# Patient Record
Sex: MALE | Race: WHITE | Employment: FULL TIME | URBAN - METROPOLITAN AREA
[De-identification: names, ages, dates, MRNs, and addresses within clinical notes are randomized per-mention and may not be internally consistent; named-entity substitution may affect disease eponyms.]

---

## 2021-12-16 ENCOUNTER — OFFICE VISIT (OUTPATIENT)
Dept: INTERNAL MEDICINE CLINIC | Facility: CLINIC | Age: 31
End: 2021-12-16
Payer: COMMERCIAL

## 2021-12-16 VITALS
BODY MASS INDEX: 25.99 KG/M2 | OXYGEN SATURATION: 98 % | DIASTOLIC BLOOD PRESSURE: 80 MMHG | HEIGHT: 71 IN | SYSTOLIC BLOOD PRESSURE: 128 MMHG | WEIGHT: 185.63 LBS | HEART RATE: 76 BPM

## 2021-12-16 DIAGNOSIS — Z00.00 WELLNESS EXAMINATION: Primary | ICD-10-CM

## 2021-12-16 DIAGNOSIS — L60.0 INGROWN NAIL OF GREAT TOE OF LEFT FOOT: ICD-10-CM

## 2021-12-16 PROCEDURE — 99385 PREV VISIT NEW AGE 18-39: CPT | Performed by: INTERNAL MEDICINE

## 2021-12-16 PROCEDURE — 3074F SYST BP LT 130 MM HG: CPT | Performed by: INTERNAL MEDICINE

## 2021-12-16 PROCEDURE — 3008F BODY MASS INDEX DOCD: CPT | Performed by: INTERNAL MEDICINE

## 2021-12-16 PROCEDURE — 3079F DIAST BP 80-89 MM HG: CPT | Performed by: INTERNAL MEDICINE

## 2021-12-16 RX ORDER — NAPROXEN 500 MG/1
TABLET ORAL
COMMUNITY
Start: 2021-10-12

## 2021-12-16 RX ORDER — FAMOTIDINE 10 MG/1
TABLET ORAL
COMMUNITY
Start: 2021-10-11

## 2021-12-16 RX ORDER — SULFAMETHOXAZOLE AND TRIMETHOPRIM 800; 160 MG/1; MG/1
TABLET ORAL
COMMUNITY
Start: 2021-11-15

## 2021-12-16 RX ORDER — IBUPROFEN 600 MG/1
TABLET ORAL
COMMUNITY
Start: 2021-11-15

## 2021-12-16 RX ORDER — TERBINAFINE HYDROCHLORIDE 250 MG/1
TABLET ORAL
COMMUNITY
Start: 2021-12-09

## 2021-12-16 NOTE — PROGRESS NOTES
Subjective:   Patient ID: Sheron Freitas is a 27year old male. HPIPatient here to get established as a new patient. Has problems with a chronic ingrown toe nail on the left toe.     Med hx - Chronic headaches  Surgical hx -  Smoker 1 pack perday  Etoh ra Visit:  Requested Prescriptions      No prescriptions requested or ordered in this encounter       Imaging & Referrals:  None

## 2022-01-17 ENCOUNTER — OFFICE VISIT (OUTPATIENT)
Dept: PODIATRY CLINIC | Facility: CLINIC | Age: 32
End: 2022-01-17
Payer: COMMERCIAL

## 2022-01-17 VITALS — SYSTOLIC BLOOD PRESSURE: 133 MMHG | DIASTOLIC BLOOD PRESSURE: 82 MMHG | HEART RATE: 77 BPM

## 2022-01-17 DIAGNOSIS — L60.0 INGROWN LEFT BIG TOENAIL: Primary | ICD-10-CM

## 2022-01-17 DIAGNOSIS — M79.675 GREAT TOE PAIN, LEFT: ICD-10-CM

## 2022-01-17 PROCEDURE — 3075F SYST BP GE 130 - 139MM HG: CPT | Performed by: PODIATRIST

## 2022-01-17 PROCEDURE — 99203 OFFICE O/P NEW LOW 30 MIN: CPT | Performed by: PODIATRIST

## 2022-01-17 PROCEDURE — 3079F DIAST BP 80-89 MM HG: CPT | Performed by: PODIATRIST

## 2022-01-17 PROCEDURE — 11750 EXCISION NAIL&NAIL MATRIX: CPT | Performed by: PODIATRIST

## 2022-01-17 NOTE — PROGRESS NOTES
Per Dr. Sherita Horner, draw up 1.5 ml 1% lidocaine and 1.5 ml of 0.5 % marcaine for injection to left big toe. Cara Lomeli    Pt given soaking instruction sheet. No VS taken post procedure.

## 2022-01-17 NOTE — PROGRESS NOTES
[facility]iat[facility]  Progress Note    Luis Quach is a 27year old male. Patient presents with:  Ingrown Toenail: Left foot great toe noticed about 2 months ago. 2/10 pain at this time. Patient noticed redness no swelling, and no drainage.         HPI proximally to warm distally bilateral.  Neurological examination:   Vibratory (128-Hz tuning fork) sensation is present to right and is present to left. Sharp/dull is present to right and is present to left.   Musculoskeletal examination:  Muscle Strength complications encountered. Written instructions have been given and reviewed with pt regarding after care. Follow up in 2 weeks. No follow-ups on file.     Delia Pichardo DPM  1/17/2022

## 2022-01-31 ENCOUNTER — OFFICE VISIT (OUTPATIENT)
Dept: PODIATRY CLINIC | Facility: CLINIC | Age: 32
End: 2022-01-31
Payer: COMMERCIAL

## 2022-01-31 DIAGNOSIS — Z98.890 S/P NAIL SURGERY: Primary | ICD-10-CM

## 2022-01-31 PROCEDURE — 99212 OFFICE O/P EST SF 10 MIN: CPT | Performed by: PODIATRIST

## 2022-01-31 NOTE — PROGRESS NOTES
[facility] Podiatry  Progress Note    Deborah Samuel is a 27year old male. Patient presents with:   Follow - Up: Left great toenail ingrown toenail removal, denies pain        HPI:     This is a pleasant male that presents to clinic today due to left ha is present to left. Musculoskeletal examination:  Muscle Strength is 5/5.         LABS & IMAGING:   No results found for: GLU, BUN, CREATSERUM, BUNCREA, ANIONGAP, GFRAA, GFRNAA, CA, NA, K, CL, CO2, OSMOCALC     No results found for: EAG, A1C     No results

## 2022-04-29 ENCOUNTER — OFFICE VISIT (OUTPATIENT)
Dept: PODIATRY CLINIC | Facility: CLINIC | Age: 32
End: 2022-04-29
Payer: COMMERCIAL

## 2022-04-29 DIAGNOSIS — L60.8 DISCOLORATION AND THICKENING OF NAILS BOTH FEET: Primary | ICD-10-CM

## 2022-04-29 PROCEDURE — 11755 BIOPSY NAIL UNIT: CPT | Performed by: PODIATRIST

## 2022-04-29 PROCEDURE — 99213 OFFICE O/P EST LOW 20 MIN: CPT | Performed by: PODIATRIST

## 2022-04-29 NOTE — PROGRESS NOTES
[facility], [facility] Podiatry  Progress Note    Amee Pickering is a 27year old male. Patient presents with:  Ingrown Toenail: R foot, great toe. States on and off pain. Denies any swelling, numbness or tingling. ---L foot, had ingrown toenail procedure back in January. Would like to check nail due to change. Currently denies any pain. HPI:     This is a pleasant male that presents to clinic today S/P left hallux lateral nail border matrixectomy f/u. He states it is not painful but has noticed some white discoloration. He also has noticed some discoloration and thickening to the right hallux toenail as well. He did finish 90 day course of oral lamisil in Feb but this did not really help. Allergies: Patient has no known allergies. No current outpatient medications on file. History reviewed. No pertinent past medical history. History reviewed. No pertinent surgical history. History reviewed. No pertinent family history. Social History    Socioeconomic History      Marital status:     Tobacco Use      Smoking status: Current Every Day Smoker      Smokeless tobacco: Never Used    Substance and Sexual Activity      Alcohol use: Yes        Comment: occ      Drug use: Never          REVIEW OF SYSTEMS:   Denies nausea, fever, chills  No calf pain  No other muscle or joint aches  Denies chest pain or shortness of breath. EXAM:   There were no vitals taken for this visit. Constitutional:   Patient in no apparent distress. Well kept Of normal body habitus. Alert and oriented to person, place, and time. Integumentary examination:   There are no varicosities. Skin appears moist, warm, and supple with positive hair growth.      B/L hallux toenails are slightly thickened and discolored  Vascular examination:   DP pulse is 2/4  PT pulse is 2/4  Capillary refill is immediate  Edema is not present bilateral.  Temperature warm proximally to warm distally bilateral.  Neurological examination:   Vibratory (128-Hz tuning fork) sensation is present to right and is present to left. Sharp/dull is present to right and is present to left. Musculoskeletal examination:  Muscle Strength is 5/5. LABS & IMAGING:   No results found for: GLU, BUN, CREATSERUM, BUNCREA, ANIONGAP, GFRAA, GFRNAA, CA, NA, K, CL, CO2, OSMOCALC     No results found for: EAG, A1C     No results found. ASSESSMENT AND PLAN:   Diagnoses and all orders for this visit:    Discoloration and thickening of nails both feet        Plan:     Reviewed treatment options for nail dystrophy / onychomycosis including:   No treatment and monitoring, topical meds, oral meds, nail removal and laser treatment. Advantages and disadvantages of each reviewed. Using oral agents would require regular   blood test monitoring due to risk of hepatotoxicity and other adverse reactions including drug interactions. Topical meds are much safer yet carry very low efficacy. Recommended nail biopsy since patient did complete oral lamisil 2 months ago with no significant improvement. Pt agrees. Procedure: Nail Biopsy [id number]  Using nail forceps a portion of the b/l hallux toenail was excised and sent to pathology for PAS stain. Pt tolerated the procedure well without complication. RTC 2 weeks, will review nail biopsy results. No follow-ups on file.     Alois Leyden, DPM  4/29/22

## 2022-05-02 ENCOUNTER — TELEPHONE (OUTPATIENT)
Dept: PODIATRY CLINIC | Facility: CLINIC | Age: 32
End: 2022-05-02

## 2022-05-02 NOTE — TELEPHONE ENCOUNTER
Please call [facility] concerning nail biopsy results. The path is showing that the status is final yet there is no information regarding the nail biopsy.

## 2022-05-02 NOTE — TELEPHONE ENCOUNTER
Called Liliane and RM on confidential VM that specimens are form right and left hallux.  CB if any further q's

## 2022-05-02 NOTE — TELEPHONE ENCOUNTER
[facility] calling to speak with nurse to ask which sight, which toe, which foot? Please call at [phone],OYSPSG.

## 2022-05-03 NOTE — TELEPHONE ENCOUNTER
S/w [facility]- she states the results are still in preliminary status and have not been finalized yet. The \"test in question\" has already been resolved. Once results are finalized they will be sent over.

## 2022-05-16 ENCOUNTER — OFFICE VISIT (OUTPATIENT)
Dept: PODIATRY CLINIC | Facility: CLINIC | Age: 32
End: 2022-05-16
Payer: COMMERCIAL

## 2022-05-16 DIAGNOSIS — M79.674 GREAT TOE PAIN, RIGHT: ICD-10-CM

## 2022-05-16 DIAGNOSIS — L60.0 INGROWN RIGHT BIG TOENAIL: Primary | ICD-10-CM

## 2022-05-16 DIAGNOSIS — L60.8 DISCOLORATION AND THICKENING OF NAILS BOTH FEET: ICD-10-CM

## 2022-05-16 PROCEDURE — 99213 OFFICE O/P EST LOW 20 MIN: CPT | Performed by: PODIATRIST

## 2022-05-16 NOTE — PROGRESS NOTES
[facility], [facility] Podiatry  Progress Note    Kali Rai is a 27year old male. Patient presents with:  Ingrown Toenail: Right  big toe f/u - had procedure on 4/29/22 - still has drainage from the R big toe - has some pain rated as 5/10 with pressure on it - at rest pain is like a 2/10 on and off   Toenail Fungus: Bilateral big toes f/u and lab results -         HPI:     This is a pleasant male that presents to clinic today for nail biopsy results. He also complains of ingrown toenail right great toe. He denies any drainage but would like to discuss treatment options. Allergies: Patient has no known allergies. No current outpatient medications on file. History reviewed. No pertinent past medical history. History reviewed. No pertinent surgical history. History reviewed. No pertinent family history. Social History    Socioeconomic History      Marital status:     Tobacco Use      Smoking status: Current Every Day Smoker      Smokeless tobacco: Never Used    Substance and Sexual Activity      Alcohol use: Yes        Comment: occ      Drug use: Never          REVIEW OF SYSTEMS:   Denies nausea, fever, chills  No calf pain  No other muscle or joint aches  Denies chest pain or shortness of breath. EXAM:   There were no vitals taken for this visit. Constitutional:   Patient in no apparent distress. Well kept Of normal body habitus. Alert and oriented to person, place, and time. Integumentary examination:   There are no varicosities. Skin appears moist, warm, and supple with positive hair growth.      Right hallux medial nail border incurvated with no SOI    B/L hallux toenails are slightly thickened and discolored  Vascular examination:   DP pulse is 2/4  PT pulse is 2/4  Capillary refill is immediate  Edema is not present bilateral.  Temperature warm proximally to warm distally bilateral.  Neurological examination:   Vibratory (128-Hz tuning fork) sensation is present to right and is present to left. Sharp/dull is present to right and is present to left. Musculoskeletal examination:  Muscle Strength is 5/5. POP to right hallux medial nail border    LABS & IMAGING:   No results found for: GLU, BUN, CREATSERUM, BUNCREA, ANIONGAP, GFRAA, GFRNAA, CA, NA, K, CL, CO2, OSMOCALC     No results found for: EAG, A1C     No results found. ASSESSMENT AND PLAN:   Diagnoses and all orders for this visit:    Ingrown right big toenail    Great toe pain, right    Discoloration and thickening of nails both feet        Plan:     B/L hallux nail biopsy was negative for fungal elements      Treatment options reviewed with the patient related to condition/diagnosis. Discussed advantages and disadvantages as well as risks/potential complications related to nail surgery. Risk of recurrence explained. Pt would like to hold off on nail surgery at this time. RTC PRN right hallux medial nail border matrixectomy    No follow-ups on file.     Pete Nelson DPM  5/16/22

## 2022-05-23 ENCOUNTER — OFFICE VISIT (OUTPATIENT)
Dept: INTERNAL MEDICINE CLINIC | Facility: CLINIC | Age: 32
End: 2022-05-23
Payer: COMMERCIAL

## 2022-05-23 VITALS
SYSTOLIC BLOOD PRESSURE: 120 MMHG | WEIGHT: 180 LBS | DIASTOLIC BLOOD PRESSURE: 70 MMHG | OXYGEN SATURATION: 97 % | BODY MASS INDEX: 25.2 KG/M2 | HEIGHT: 71 IN | HEART RATE: 81 BPM

## 2022-05-23 DIAGNOSIS — S39.012A STRAIN OF LUMBAR REGION, INITIAL ENCOUNTER: Primary | ICD-10-CM

## 2022-05-23 PROCEDURE — 3078F DIAST BP <80 MM HG: CPT | Performed by: INTERNAL MEDICINE

## 2022-05-23 PROCEDURE — 99213 OFFICE O/P EST LOW 20 MIN: CPT | Performed by: INTERNAL MEDICINE

## 2022-05-23 PROCEDURE — 3008F BODY MASS INDEX DOCD: CPT | Performed by: INTERNAL MEDICINE

## 2022-05-23 PROCEDURE — 3074F SYST BP LT 130 MM HG: CPT | Performed by: INTERNAL MEDICINE

## 2022-05-23 NOTE — PROGRESS NOTES
Subjective:   Patient ID: Gustavo Luke is a 27year old male. Back Pain  Pertinent negatives include no numbness or weakness. Pt here for evaluation of right flank pains which resolved on its own. Has been a  for 10 years. Has done some on and off . Has used Ibuprofen with some improvement. History/Other:   Review of Systems   Musculoskeletal: Positive for back pain. Neurological: Negative for weakness and numbness. No current outpatient medications on file. Allergies:No Known Allergies    Objective:   Physical Exam  Constitutional:       Appearance: He is normal weight. He is not ill-appearing. Eyes:      General: No scleral icterus. Pupils: Pupils are equal, round, and reactive to light. Cardiovascular:      Rate and Rhythm: Normal rate and regular rhythm. Pulmonary:      Effort: Pulmonary effort is normal.      Breath sounds: Normal breath sounds. Abdominal:      Palpations: Abdomen is soft. Musculoskeletal:         General: Tenderness (left paravertebral muscle pain ) present. Cervical back: Neck supple. Right lower leg: No edema. Left lower leg: No edema. Skin:     Findings: No lesion. Neurological:      Mental Status: Mental status is at baseline. Psychiatric:         Thought Content: Thought content normal.         Assessment & Plan:   Strain of lumbar region, initial encounter  (primary encounter diagnosis)   - will get xray if symptoms return  No orders of the defined types were placed in this encounter.       Meds This Visit:  Requested Prescriptions      No prescriptions requested or ordered in this encounter       Imaging & Referrals:  XR LUMBAR SPINE (MIN 4 VIEWS) (CPT=72110)

## 2023-07-20 ENCOUNTER — OFFICE VISIT (OUTPATIENT)
Dept: INTERNAL MEDICINE CLINIC | Facility: CLINIC | Age: 33
End: 2023-07-20
Payer: COMMERCIAL

## 2023-07-20 VITALS
HEART RATE: 70 BPM | BODY MASS INDEX: 26.32 KG/M2 | DIASTOLIC BLOOD PRESSURE: 70 MMHG | WEIGHT: 188 LBS | SYSTOLIC BLOOD PRESSURE: 120 MMHG | HEIGHT: 71 IN | OXYGEN SATURATION: 98 % | TEMPERATURE: 98 F

## 2023-07-20 DIAGNOSIS — Z00.00 WELLNESS EXAMINATION: ICD-10-CM

## 2023-07-20 DIAGNOSIS — F17.210 CIGARETTE SMOKER: ICD-10-CM

## 2023-07-20 DIAGNOSIS — L05.91 PILONIDAL CYST WITHOUT INFECTION: Primary | ICD-10-CM

## 2023-07-20 PROCEDURE — 99395 PREV VISIT EST AGE 18-39: CPT | Performed by: INTERNAL MEDICINE

## 2023-07-20 PROCEDURE — 3008F BODY MASS INDEX DOCD: CPT | Performed by: INTERNAL MEDICINE

## 2023-07-20 PROCEDURE — 3074F SYST BP LT 130 MM HG: CPT | Performed by: INTERNAL MEDICINE

## 2023-07-20 PROCEDURE — 3078F DIAST BP <80 MM HG: CPT | Performed by: INTERNAL MEDICINE

## 2023-07-20 RX ORDER — VARENICLINE TARTRATE 1 MG/1
1 TABLET, FILM COATED ORAL 2 TIMES DAILY
Qty: 60 TABLET | Refills: 6 | Status: SHIPPED | OUTPATIENT
Start: 2023-07-20 | End: 2024-02-15

## 2023-07-20 RX ORDER — CEPHALEXIN 500 MG/1
500 CAPSULE ORAL 3 TIMES DAILY
Qty: 30 CAPSULE | Refills: 3 | Status: SHIPPED | OUTPATIENT
Start: 2023-07-20

## 2023-08-09 LAB
ABSOLUTE BASOPHILS: 63 CELLS/UL (ref 0–200)
ABSOLUTE EOSINOPHILS: 252 CELLS/UL (ref 15–500)
ABSOLUTE LYMPHOCYTES: 2310 CELLS/UL (ref 850–3900)
ABSOLUTE MONOCYTES: 588 CELLS/UL (ref 200–950)
ABSOLUTE NEUTROPHILS: 3787 CELLS/UL (ref 1500–7800)
ALBUMIN/GLOBULIN RATIO: 1.8 (CALC) (ref 1–2.5)
ALBUMIN: 4.6 G/DL (ref 3.6–5.1)
ALKALINE PHOSPHATASE: 57 U/L (ref 36–130)
ALT: 20 U/L (ref 9–46)
AST: 21 U/L (ref 10–40)
BASOPHILS: 0.9 %
BILIRUBIN, TOTAL: 0.7 MG/DL (ref 0.2–1.2)
BUN: 17 MG/DL (ref 7–25)
CALCIUM: 9.7 MG/DL (ref 8.6–10.3)
CARBON DIOXIDE: 28 MMOL/L (ref 20–32)
CHLORIDE: 105 MMOL/L (ref 98–110)
CHOL/HDLC RATIO: 5.2 (CALC)
CHOLESTEROL, TOTAL: 196 MG/DL
CREATININE: 1.04 MG/DL (ref 0.6–1.26)
EGFR: 97 ML/MIN/1.73M2
EOSINOPHILS: 3.6 %
GLOBULIN: 2.6 G/DL (CALC) (ref 1.9–3.7)
GLUCOSE: 94 MG/DL (ref 65–99)
HDL CHOLESTEROL: 38 MG/DL
HEMATOCRIT: 49.3 % (ref 38.5–50)
HEMOGLOBIN: 16.6 G/DL (ref 13.2–17.1)
LDL-CHOLESTEROL: 129 MG/DL (CALC)
LYMPHOCYTES: 33 %
MCH: 31.9 PG (ref 27–33)
MCHC: 33.7 G/DL (ref 32–36)
MCV: 94.6 FL (ref 80–100)
MONOCYTES: 8.4 %
MPV: 11 FL (ref 7.5–12.5)
NEUTROPHILS: 54.1 %
NON-HDL CHOLESTEROL: 158 MG/DL (CALC)
PLATELET COUNT: 219 THOUSAND/UL (ref 140–400)
POTASSIUM: 4.6 MMOL/L (ref 3.5–5.3)
PROTEIN, TOTAL: 7.2 G/DL (ref 6.1–8.1)
RDW: 12.6 % (ref 11–15)
RED BLOOD CELL COUNT: 5.21 MILLION/UL (ref 4.2–5.8)
SODIUM: 143 MMOL/L (ref 135–146)
TRIGLYCERIDES: 171 MG/DL
WHITE BLOOD CELL COUNT: 7 THOUSAND/UL (ref 3.8–10.8)

## 2023-11-16 ENCOUNTER — OFFICE VISIT (OUTPATIENT)
Dept: INTERNAL MEDICINE CLINIC | Facility: CLINIC | Age: 33
End: 2023-11-16
Payer: COMMERCIAL

## 2023-11-16 VITALS
DIASTOLIC BLOOD PRESSURE: 70 MMHG | WEIGHT: 190 LBS | HEIGHT: 71 IN | HEART RATE: 72 BPM | BODY MASS INDEX: 26.6 KG/M2 | OXYGEN SATURATION: 99 % | SYSTOLIC BLOOD PRESSURE: 120 MMHG

## 2023-11-16 DIAGNOSIS — R39.198 URINARY STREAM SLOWING: ICD-10-CM

## 2023-11-16 DIAGNOSIS — J35.01 CHRONIC TONSILLITIS: Primary | ICD-10-CM

## 2023-11-16 PROCEDURE — 3078F DIAST BP <80 MM HG: CPT | Performed by: INTERNAL MEDICINE

## 2023-11-16 PROCEDURE — 3008F BODY MASS INDEX DOCD: CPT | Performed by: INTERNAL MEDICINE

## 2023-11-16 PROCEDURE — 3074F SYST BP LT 130 MM HG: CPT | Performed by: INTERNAL MEDICINE

## 2023-11-16 PROCEDURE — 99214 OFFICE O/P EST MOD 30 MIN: CPT | Performed by: INTERNAL MEDICINE

## 2023-11-16 RX ORDER — AMOXICILLIN 500 MG/1
500 CAPSULE ORAL 3 TIMES DAILY
Qty: 30 CAPSULE | Refills: 0 | Status: SHIPPED | OUTPATIENT
Start: 2023-11-16 | End: 2023-11-26

## 2023-11-21 ENCOUNTER — TELEPHONE (OUTPATIENT)
Dept: INTERNAL MEDICINE CLINIC | Facility: CLINIC | Age: 33
End: 2023-11-21

## 2023-11-21 DIAGNOSIS — R39.198 SLOWING, URINARY STREAM: Primary | ICD-10-CM

## 2023-11-21 NOTE — TELEPHONE ENCOUNTER
E-message from [facility] received re: [facility] [address] [facility] and new [place] referral needs to be written. \"Please note that the requested provider the patient is being referred to Damaso Pickard MD) is NOT an In-Network Provider. Please re-direct the patient to an In-Network Provider. Please update the referral with In-Network provider and send a message back when completed so we can obtain authorization. Until we hear back, this referral will remain closed. If this helps, Dr Kervin Gamboa in [place] office, Dr Tom Esteban in [place] offices, or Dr Cristobal Redd in [place] office are within the patient's insurance plan. The patient can also contact [id number] at [phone] for a provider within their plan. Thank you,  Scot Pacheco  Patient Referral Specialist\"  ___________    New referral to Dr Jakub Rubi generated for patient. [employer] message sent to patient re: new referral and reason why.  ___________    Pt now messaging/asking for polish speaking urologist -- none of whom I am aware of that is on the \"in network\" [facility] specialist list. Advised he call [employer] and ask if they can identify a polish speaking in- for him and he can then share w/ Dr Nick Espinosa and we can re-do the referral at that time.   Also explained the availability of  services for [facility] specialist that could be utilized at Dr Sandra Velasco office as well, in lieu of a native polish speaking

## 2024-01-08 ENCOUNTER — OFFICE VISIT (OUTPATIENT)
Dept: SURGERY | Facility: CLINIC | Age: 34
End: 2024-01-08
Payer: COMMERCIAL

## 2024-01-08 VITALS — DIASTOLIC BLOOD PRESSURE: 75 MMHG | HEART RATE: 72 BPM | SYSTOLIC BLOOD PRESSURE: 117 MMHG

## 2024-01-08 DIAGNOSIS — Z87.442 HISTORY OF NEPHROLITHIASIS: ICD-10-CM

## 2024-01-08 DIAGNOSIS — R39.12 WEAK URINARY STREAM: Primary | ICD-10-CM

## 2024-01-08 DIAGNOSIS — R82.90 ABNORMAL URINALYSIS: ICD-10-CM

## 2024-01-08 LAB
BILIRUBIN: NEGATIVE
GLUCOSE (URINE DIPSTICK): NEGATIVE MG/DL
KETONES (URINE DIPSTICK): NEGATIVE MG/DL
LEUKOCYTES: NEGATIVE
MULTISTIX LOT#: ABNORMAL NUMERIC
NITRITE, URINE: NEGATIVE
PH, URINE: 5.5 (ref 4.5–8)
PROTEIN (URINE DIPSTICK): NEGATIVE MG/DL
SPECIFIC GRAVITY: 1.03 (ref 1–1.03)
UROBILINOGEN,SEMI-QN: 0.2 MG/DL (ref 0–1.9)

## 2024-01-08 NOTE — PROGRESS NOTES
[facility]rology  Initial Office Consultation    HPI:   Magdaleno Morillo is a 33 year old male here today for consultation at the request of, and a copy of this note will be sent to, Liliane Freeman MD.    1. Weak Stream  Patient presents for further evaluation and management of weak urinary stream.    Reports problem onset about a few years ago.  He has history of nephrolithiasis and reports about 2 episodes in the past where he passed kidney stones.  Last kidney stone episode was about 3 to 4 years ago.  He never required intervention.    He denies any significant associated lower urinary tract symptoms apart from the weak stream.  He reports he sometimes has to sit down to urinate.  His IPSS is 6 (0/0/0/0/5/0/1) with a quality-of-life score of 3.      He denies straining to urinate, intermittency, hesitancy, or postvoid dribbling.  No sensation of incomplete bladder emptying.  No frequency or urgency.  Nocturia x 0-1.    No history of STIs or UTIs.  Sexually active with 1 female partner.  No urethral discharge.  No hematospermia.    No dysuria or gross hematuria.    - 1/2024 consult: PVR 27 mL.  Dipstick UA with trace lysed blood.  No signs of UTI.  Send urine for microscopy.  Offered patient cystoscopy and uroflow for further evaluation he will consider.      PAST MEDICAL HISTORY: Nephrolithiasis.    PAST SURGICAL HISTORY: None.    SOCIAL HISTORY: . No children. Active smoker of 1 PPD for 12 years. Rare social alcohol. Works as a .     History reviewed. No pertinent family history.    Allergies: Patient has no known allergies.      REVIEW OF SYSTEMS:  Pertinent positives and negatives per HPI. A 12-point ROS was performed and is otherwise negative.       EXAM:  /75 (BP Location: Right arm, Patient Position: Sitting, Cuff Size: adult)   Pulse 72     Physical Exam  Constitutional:       General: He is not in acute distress.     Appearance: He is well-developed.   HENT:       Head: Normocephalic and atraumatic.   Eyes:      General: No scleral icterus.  Cardiovascular:      Rate and Rhythm: Normal rate.   Pulmonary:      Effort: Pulmonary effort is normal.   Abdominal:      General: There is no distension.      Palpations: Abdomen is soft.      Tenderness: There is no abdominal tenderness.   Genitourinary:     Penis: Uncircumcised.       Testes: Normal.         Right: Mass, tenderness or swelling not present.         Left: Mass, tenderness or swelling not present.      Epididymis:      Right: Normal.      Left: Normal.      Prostate: Normal. Not enlarged, not tender and no nodules present.      Rectum: Normal.   Musculoskeletal:         General: Normal range of motion.      Cervical back: Neck supple.   Skin:     General: Skin is warm and dry.   Neurological:      Mental Status: He is alert and oriented to person, place, and time.   Psychiatric:         Mood and Affect: Mood normal.         Behavior: Behavior normal.       LABS:  No results found.      IMAGING:  No results found.      IMPRESSION:  33 year old male with weak stream.  History of nephrolithiasis, not requiring intervention.    Findings reviewed with patient at length.  He has an isolated complaint of weak stream.  Prostate is not enlarged on JAZMIN.    I discussed with patient and offered him flexible cystourethroscopy as well as uroflowmetry for further evaluation of his symptoms.  I would be specifically evaluating for urethral stricture disease or any urethral stones.  The prostate would also be evaluated as well.    Discussed rationale, approach, benefits, risks, and alternatives.  Patient will consider these options and let us know if he wishes to schedule those tests.    I also discussed with him possibility of trial of an alpha-blocker to see if his symptoms improve however we also discussed the side effects of retrograde ejaculation, which would be detrimental especially if he will be trying to father  children.    Patient verbalized understanding.  All questions answered.    His urine dipstick analysis shows a trace amount of blood, lysed.  We will send down for microscopy at that the lab.  He will be notified of any significant findings.      PLAN:  1.  Patient will consider uroflowmetry and flexible cystourethroscopy in the office for further evaluation of his weak stream.  He will let us know if he is interested in scheduling those procedures.    2.  Send urine for microscopic analysis.  Patient will be notified of the results.    Layo Park MD  1/8/2024

## 2024-01-09 ENCOUNTER — TELEPHONE (OUTPATIENT)
Dept: SURGERY | Facility: CLINIC | Age: 34
End: 2024-01-09

## 2024-01-09 DIAGNOSIS — R31.29 MICROSCOPIC HEMATURIA: Primary | ICD-10-CM

## 2024-01-09 NOTE — TELEPHONE ENCOUNTER
Called patient, verified name and . I read to patient Dr. Park's message. Pt agreeable to proceed with cysto,uroflow and ultrasound. I scheduled pt for cysto and uroflow, let him know to arrive with full bladder. Also provided him with the phone number to call to schedule the ultrasound. Pt verbalized understandings and has no further questions.   Encounter complete.

## 2024-01-09 NOTE — TELEPHONE ENCOUNTER
----- Message from Layo Park MD sent at 1/9/2024  8:26 AM CST -----  Results reviewed.  Office staff, please contact patient and inform them that his urine sample from 1/8/2024 shows a small amount of blood in the urine when examined under the microscope.    With that being said, I strongly recommend office cystoscopy for further evaluation as well as obtaining an ultrasound of the kidneys and bladder.     If he is agreeable, please order renal/bladder ultrasound and schedule for office cystoscopy as well as uroflowmetry.    Layo Park MD  1/9/2024

## 2024-01-10 ENCOUNTER — HOSPITAL ENCOUNTER (OUTPATIENT)
Dept: ULTRASOUND IMAGING | Age: 34
Discharge: HOME | End: 2024-01-10
Attending: UROLOGY
Payer: COMMERCIAL

## 2024-01-10 DIAGNOSIS — R31.29 MICROSCOPIC HEMATURIA: ICD-10-CM

## 2024-01-10 PROCEDURE — 76770 US EXAM ABDO BACK WALL COMP: CPT | Performed by: UROLOGY

## 2024-01-18 ENCOUNTER — PROCEDURE (OUTPATIENT)
Dept: SURGERY | Facility: CLINIC | Age: 34
End: 2024-01-18
Payer: COMMERCIAL

## 2024-01-18 VITALS — DIASTOLIC BLOOD PRESSURE: 70 MMHG | HEART RATE: 71 BPM | SYSTOLIC BLOOD PRESSURE: 128 MMHG

## 2024-01-18 DIAGNOSIS — R31.29 MICROSCOPIC HEMATURIA: ICD-10-CM

## 2024-01-18 DIAGNOSIS — N20.0 KIDNEY STONE ON LEFT SIDE: ICD-10-CM

## 2024-01-18 DIAGNOSIS — N32.0 BLADDER-NECK OBSTRUCTION: ICD-10-CM

## 2024-01-18 DIAGNOSIS — R39.12 WEAK URINARY STREAM: Primary | ICD-10-CM

## 2024-01-18 PROCEDURE — 52000 CYSTOURETHROSCOPY: CPT | Performed by: UROLOGY

## 2024-01-18 PROCEDURE — 3074F SYST BP LT 130 MM HG: CPT | Performed by: UROLOGY

## 2024-01-18 PROCEDURE — 99214 OFFICE O/P EST MOD 30 MIN: CPT | Performed by: UROLOGY

## 2024-01-18 PROCEDURE — 3078F DIAST BP <80 MM HG: CPT | Performed by: UROLOGY

## 2024-01-18 PROCEDURE — 51741 ELECTRO-UROFLOWMETRY FIRST: CPT | Performed by: UROLOGY

## 2024-01-18 RX ORDER — ALFUZOSIN HYDROCHLORIDE 10 MG/1
10 TABLET, EXTENDED RELEASE ORAL
Qty: 90 TABLET | Refills: 1 | Status: SHIPPED | OUTPATIENT
Start: 2024-01-18

## 2024-01-18 RX ORDER — ALFUZOSIN HYDROCHLORIDE 10 MG/1
10 TABLET, EXTENDED RELEASE ORAL
Qty: 90 TABLET | Refills: 1 | Status: SHIPPED | OUTPATIENT
Start: 2024-01-18 | End: 2024-01-18

## 2024-01-18 RX ORDER — CIPROFLOXACIN 500 MG/1
500 TABLET, FILM COATED ORAL ONCE
Status: COMPLETED | OUTPATIENT
Start: 2024-01-18 | End: 2024-01-18

## 2024-01-18 RX ADMIN — CIPROFLOXACIN 500 MG: 500 TABLET, FILM COATED ORAL at 15:52:00

## 2024-01-18 NOTE — PROGRESS NOTES
[facility]  Follow-Up Visit    HPI: Magdaleno Morillo is a 33 year old male presents for a follow up visit. Patient was last seen on 1/8/2024.    INTERVAL HISTORY: Patient presents for office cystoscopy and uroflowmetry for further evaluation of weak stream and microscopic hematuria.    Microscopic urinalysis from 1/8/2024 revealing 3-5 RBCs per hpf.  No signs of infection.    Renal bladder ultrasound completed 1/10/2024 revealing no suspicious renal lesion or hydronephrosis.  Questionable small left renal stone measuring up to 3 mm.    Patient denies gross hematuria or dysuria.  No flank pain, fevers or chills.  Again complains of weak stream and nocturia x 1.      1. Weak Stream  2. Microhematuria  3. Nephrolithiasis  Patient presents for further evaluation and management of weak urinary stream.     Reports problem onset about a few years ago.  He has history of nephrolithiasis and reports about 2 episodes in the past where he passed kidney stones.  Last kidney stone episode was about 3 to 4 years ago.  He never required intervention.     He denies any significant associated lower urinary tract symptoms apart from the weak stream.  He reports he sometimes has to sit down to urinate.  His IPSS is 6 (0/0/0/0/5/0/1) with a quality-of-life score of 3.       He denies straining to urinate, intermittency, hesitancy, or postvoid dribbling.  No sensation of incomplete bladder emptying.  No frequency or urgency.  Nocturia x 0-1.     No history of STIs or UTIs.  Sexually active with 1 female partner.  No urethral discharge.  No hematospermia.     No dysuria or gross hematuria.     - 1/2024 consult: PVR 27 mL.  Dipstick UA with trace lysed blood.  No signs of UTI.  Send urine for microscopy.  Offered patient cystoscopy and uroflow for further evaluation he will consider.    Urine microscopy revealed 3-5 RBCs per hpf.    - 1/18/24 F/U:  - Renal bladder ultrasound revealing questionable small left renal  stone up to 3 mm.  No other abnormalities.    - Uroflow + PVR before cystoscopy: Voided 87.6 mL, Qmax 6.2 mL/s, Qave 4.4 mL/s, flow time 19.8 sec, voiding time 25.9 sec, voiding curve shape flattened with low peak, PVR 40 mL.      - Cystourethroscopy revealing no evidence of urethral strictures, stones, or lesions.  No significant prostate enlargement noted.  Mildly high riding bladder neck, ? Bladder neck obstruction.  Bladder normal without masses, tumors, stones, or lesions.    - Uroflow + PVR after cystoscopy: Voided 248 mL, Qmax 5.8 mL/s, Qave 2.7 mL/s, flow time 1 minute 31 sec, voiding time 1 minute 41 sec, voiding curve shape flattened with low peak,  mL.          PAST MEDICAL HISTORY: Nephrolithiasis.     PAST SURGICAL HISTORY: None.     SOCIAL HISTORY: . No children. Active smoker of 1 PPD for 12 years. Rare social alcohol. Works as a .      History reviewed. No pertinent family history.     Allergies: Patient has no known allergies.    Reviewed past medical, surgical, family, and social history.  Reviewed med list and allergies.      REVIEW OF SYSTEMS:  Pertinent positives and negatives per HPI. A 10-point ROS was performed and is otherwise negative.       EXAM:  /70   Pulse 71     Physical Exam  Constitutional:       Appearance: He is well-developed.   HENT:      Head: Normocephalic.   Eyes:      General: No scleral icterus.  Cardiovascular:      Rate and Rhythm: Normal rate.   Pulmonary:      Effort: Pulmonary effort is normal.   Genitourinary:     Comments: JAZMIN not performed today however on 1/8/2024 revealed a normal nonenlarged prostate that is nontender and non-nodular.  Skin:     General: Skin is warm and dry.   Neurological:      Mental Status: He is alert and oriented to person, place, and time.   Psychiatric:         Mood and Affect: Mood normal.         Behavior: Behavior normal.     PATHOLOGY:  No results found.      LABS:  See HPI for  details.      IMAGING:    US KIDNEY/BLADDER (1/11/2024): No suspicious renal lesion.  No hydronephrosis.  Questionable small left renal stone measuring up to 3 mm.        UROLOGY PROCEDURE:    CYSTOURETHROSCOPY  Informed consent was obtained for the procedure. The site was prepped and draped in the usual sterile fashion. An Ambu 16 Kiswahili flexible cystoscope was utilized for the procedure.    Anesthesia:  2% lidocaine gel.    Urethra: Normal without strictures, masses, stones, tumors, or lesions.    Prostate / Pelvic: Normal without significant enlargement or obstruction.  Bladder neck is slightly high riding.    Bladder: Normal.  No tumor, stone, diverticulum, or glomerulation.    U.O's: Normal.    Trabeculation: Not appreciated.    POST CYSTOSCOPY MEDICATIONS: sample one tablet Cipro 500 mg given to patient.    DIAGNOSIS: Essentially normal cystourethroscopy, questionable high riding bladder neck/bladder neck obstruction.      IMPRESSION:  33 year old male with weak stream.  UA with microscopic hematuria.    Further evaluation of microhematuria included a renal bladder ultrasound which showed a questionable nonobstructing 2 to 3 mm left kidney stone.    Uroflowmetry with clear evidence of obstructed flow.    Cystourethroscopy without evidence of any urethral strictures, masses, or stones.  Prostate is not clinically obstructive.  Bladder neck is slightly high riding.    Following these procedures, I had a separate, clearly identifiable, and clinically significant discussion with the patient.    We reviewed these findings.  Given his clearly obstructed uroflow with the absence of significant prostate enlargement or urethral pathology, I suspect possible bladder neck obstruction as the etiology or contributing factor to his weak stream.    We discussed management options including medical and surgical treatment.  We discussed medical therapy with alpha blockers including benefits, risks, side effects, and  alternatives.  We discussed side effect of retrograde ejaculation.  We discussed that alfuzosin has the lowest risk of retrograde ejaculation.    Patient agreeable to a trial of medical therapy.    Regarding nonobstructing small asymptomatic left kidney stone: Management options discussed including conservative management, ESWL, ureteroscopy with lithotripsy and stone removal.  Patient elects conservative management.    All questions answered.      PLAN:  1.  Start trial of alfuzosin 10 mg daily with dinner for management of suspected primary bladder neck obstruction.    Follow-up in 3 months for an updated IPSS score, and bladder scan/PVR.  May consider repeat uroflow at that point.      MDM complexity: Moderate.  Initiating prescription drug therapy/management.    Layo Park MD  1/18/2024

## 2024-03-21 ENCOUNTER — OFFICE VISIT (OUTPATIENT)
Dept: INTERNAL MEDICINE CLINIC | Facility: CLINIC | Age: 34
End: 2024-03-21
Payer: COMMERCIAL

## 2024-03-21 VITALS
WEIGHT: 194 LBS | OXYGEN SATURATION: 96 % | HEART RATE: 83 BPM | SYSTOLIC BLOOD PRESSURE: 120 MMHG | DIASTOLIC BLOOD PRESSURE: 70 MMHG | HEIGHT: 71 IN | BODY MASS INDEX: 27.16 KG/M2

## 2024-03-21 DIAGNOSIS — L60.0 INGROWN NAIL OF GREAT TOE OF RIGHT FOOT: ICD-10-CM

## 2024-03-21 DIAGNOSIS — Z71.6 ENCOUNTER FOR SMOKING CESSATION COUNSELING: ICD-10-CM

## 2024-03-21 DIAGNOSIS — R10.9 RIGHT FLANK PAIN: Primary | ICD-10-CM

## 2024-03-21 PROCEDURE — 99214 OFFICE O/P EST MOD 30 MIN: CPT | Performed by: INTERNAL MEDICINE

## 2024-03-21 RX ORDER — NICOTINE 21 MG/24HR
1 PATCH, TRANSDERMAL 24 HOURS TRANSDERMAL EVERY 24 HOURS
Qty: 30 EACH | Refills: 2 | Status: SHIPPED | OUTPATIENT
Start: 2024-03-21

## 2024-03-21 NOTE — PROGRESS NOTES
Subjective:   Magdaleno Morillo is a 33 year old male who presents for Kidney Problem       Patient here for evaluation of right flank and anterior abdominal pain, dull pain in association with caffenated beverages.   Has had hx of kidney stones in the past cleared by himself. Had a urethrocystoscpy recently for cunha of weakened stream.  History/Other:    Chief Complaint Reviewed and Verified  No Further Nursing Notes to   Review  Medications Reviewed         Tobacco:  He currently smokes tobacco.  Social History    Tobacco Use      Smoking status: Every Day      Smokeless tobacco: Never     Tobacco Cessation Documentation (Smoking and Smokeless included):  I had an in depth therapy session with Magdaleno Morillo about his tobacco use risks and options using the [employer]PSTF's Five A's approach:    Ask: Magdaleno is using tobacco products.  Assess: We asked about/assessed behavioral health risk and factors affecting choice of behavior change goals/methods.  Specifically I asked about readiness to quit tobacco.  Advise: We gave clear, specific, and personalized behavior change advice, including information about personal health harms and benefits. Specifically, he was told that Quitting tobacco is one of the best things he can do for his health. I strongly encouraged him to quit.      Agree: We collaboratively selected appropriate treatment goals and methods based on the patient’s interest in and willingness to change the behavior.   Assist: We used behavior change techniques (self-help and/or counseling), to aid Magdaleno in achieving agreed-upon goals by acquiring the skills, confidence, and social/environmental supports for behavior change, supplemented with adjunctive medical treatments when appropriate. Additionally, national quitting tobacco aides were discussed.   Arrange: Follow up at next visit- see specific follow up below.    Time Counseled: 5 minutes       Current Outpatient Medications   Medication Sig Dispense Refill     alfuzosin ER 10 MG Oral Tablet 24 Hr Take 1 tablet (10 mg total) by mouth daily with dinner. 90 tablet 1         Review of Systems:  Review of Systems   Genitourinary:  Positive for flank pain. Negative for dysuria and frequency.        Decreased stream             Objective:   /70   Pulse 83   Ht 5' 11\" (1.803 m)   Wt 194 lb (88 kg)   SpO2 96%   BMI 27.06 kg/m²  Estimated body mass index is 27.06 kg/m² as calculated from the following:    Height as of this encounter: 5' 11\" (1.803 m).    Weight as of this encounter: 194 lb (88 kg).  Physical Exam  HENT:      Head: Normocephalic and atraumatic.   Eyes:      Pupils: Pupils are equal, round, and reactive to light.   Cardiovascular:      Rate and Rhythm: Normal rate and regular rhythm.   Pulmonary:      Effort: Pulmonary effort is normal.      Breath sounds: Normal breath sounds.   Abdominal:      Tenderness: There is right CVA tenderness.   Musculoskeletal:      Cervical back: Neck supple.      Right lower leg: No edema.      Left lower leg: No edema.   Skin:     Findings: No lesion.   Neurological:      Mental Status: He is alert. Mental status is at baseline.   Psychiatric:         Thought Content: Thought content normal.           Assessment & Plan:   1. Right flank pain (Primary)   refer for Ct scan kidney stone protocol  -     CT ABDOMEN+PELVIS KIDNEYSTONE 2D RNDR(NO IV,NO ORAL)(CPT=74176); Future; Expected date: 03/21/2024    2.   Smoking cessation - Nicotine patch  21 mg daily    No follow-ups on file.    Liliane Freeman MD, 3/21/2024, 3:10 PM

## 2024-05-14 ENCOUNTER — OFFICE VISIT (OUTPATIENT)
Dept: PODIATRY CLINIC | Facility: CLINIC | Age: 34
End: 2024-05-14

## 2024-05-14 DIAGNOSIS — M79.674 GREAT TOE PAIN, RIGHT: ICD-10-CM

## 2024-05-14 DIAGNOSIS — L60.0 INGROWN RIGHT BIG TOENAIL: Primary | ICD-10-CM

## 2024-05-14 PROCEDURE — 99213 OFFICE O/P EST LOW 20 MIN: CPT | Performed by: PODIATRIST

## 2024-05-28 ENCOUNTER — OFFICE VISIT (OUTPATIENT)
Dept: PODIATRY CLINIC | Facility: CLINIC | Age: 34
End: 2024-05-28

## 2024-05-28 DIAGNOSIS — L60.0 INGROWN RIGHT BIG TOENAIL: Primary | ICD-10-CM

## 2024-05-28 DIAGNOSIS — M79.674 GREAT TOE PAIN, RIGHT: ICD-10-CM

## 2024-05-28 PROCEDURE — 99213 OFFICE O/P EST LOW 20 MIN: CPT | Performed by: PODIATRIST

## 2024-05-28 NOTE — PROGRESS NOTES
[facility]iat[facility]  Progress Note    Magdaleno Morillo is a 33 year old male.   Chief Complaint   Patient presents with    Ingrown Toenail     R hallux f/u- has redness- rates pain 7-8/10 only when touched         HPI:     This is a pleasant male that presents to clinic today due to painful ingrown toenail right great toe.  He states the pain has been worsening and would like to proceed with nail surgery.        Allergies: Patient has no known allergies.   Current Outpatient Medications   Medication Sig Dispense Refill    nicotine 21 MG/24HR Transdermal Patch 24 Hr Place 1 patch onto the skin daily. 30 each 2    alfuzosin ER 10 MG Oral Tablet 24 Hr Take 1 tablet (10 mg total) by mouth daily with dinner. 90 tablet 1      No past medical history on file.   No past surgical history on file.   No family history on file.   Social History     Socioeconomic History    Marital status:    Tobacco Use    Smoking status: Every Day    Smokeless tobacco: Never   Substance and Sexual Activity    Alcohol use: Yes     Comment: occ    Drug use: Never           REVIEW OF SYSTEMS:   Denies nausea, fever, chills  No calf pain  No other muscle or joint aches  Denies chest pain or shortness of breath.      EXAM:   There were no vitals taken for this visit.    Constitutional:   Patient in no apparent distress. Well kept Of normal body habitus. Alert and oriented to person, place, and time.  Integumentary examination:   There are no varicosities.   Skin appears moist, warm, and supple with positive hair growth.     Right hallux lateral nail border incurvated with no SOI    Vascular examination:   DP pulse is 2/4  PT pulse is 2/4  Capillary refill is immediate  Edema is not present bilateral.  Temperature warm proximally to warm distally bilateral.  Neurological examination:   Vibratory (128-Hz tuning fork) sensation is present to right and is present to left.  Sharp/dull is present to right and is present to left.  Musculoskeletal  examination:  Muscle Strength is 5/5.    POP to right hallux lateral nail border    LABS & IMAGING:     Lab Results   Component Value Date    GLU 94 08/08/2023    BUN 17 08/08/2023    CREATSERUM 1.04 08/08/2023    BUNCREA SEE NOTE: 08/08/2023    CA 9.7 08/08/2023     08/08/2023    K 4.6 08/08/2023     08/08/2023    CO2 28 08/08/2023        No results found for: \"EAG\", \"A1C\"     No results found.     ASSESSMENT AND PLAN:   Diagnoses and all orders for this visit:    Ingrown right big toenail    Great toe pain, right            Plan:       Treatment options reviewed with the patient related to condition/diagnosis.  Discussed advantages and disadvantages as well as risks/potential complications related to nail surgery.  Risk of recurrence explained.    Pt would like to proceed with right hallux lateral nail border matrixectomy      RTC tomorrow for right hallux lateral nail border matrixectomy    No follow-ups on file.    Jose G Matt DPM  5/28/24

## 2024-05-29 ENCOUNTER — OFFICE VISIT (OUTPATIENT)
Dept: PODIATRY CLINIC | Facility: CLINIC | Age: 34
End: 2024-05-29

## 2024-05-29 VITALS — SYSTOLIC BLOOD PRESSURE: 157 MMHG | HEART RATE: 65 BPM | DIASTOLIC BLOOD PRESSURE: 104 MMHG

## 2024-05-29 DIAGNOSIS — M79.674 GREAT TOE PAIN, RIGHT: ICD-10-CM

## 2024-05-29 DIAGNOSIS — L60.0 INGROWN RIGHT BIG TOENAIL: Primary | ICD-10-CM

## 2024-05-29 PROCEDURE — 11750 EXCISION NAIL&NAIL MATRIX: CPT | Performed by: PODIATRIST

## 2024-05-29 NOTE — PROGRESS NOTES
[facility] Podiatry  Progress Note    Magdaleno Morillo is a 33 year old male.   Chief Complaint   Patient presents with    Ingrown Toenail     Right big toe f/u- here for procedure - has no drainage - pain with touch          HPI:     This is a pleasant male that presents to clinic today due to painful ingrown toenail right great toe.  He states the pain has been worsening and would like to proceed with nail surgery.        Allergies: Patient has no known allergies.   Current Outpatient Medications   Medication Sig Dispense Refill    nicotine 21 MG/24HR Transdermal Patch 24 Hr Place 1 patch onto the skin daily. 30 each 2    alfuzosin ER 10 MG Oral Tablet 24 Hr Take 1 tablet (10 mg total) by mouth daily with dinner. 90 tablet 1      History reviewed. No pertinent past medical history.   History reviewed. No pertinent surgical history.   History reviewed. No pertinent family history.   Social History     Socioeconomic History    Marital status:    Tobacco Use    Smoking status: Every Day    Smokeless tobacco: Never   Substance and Sexual Activity    Alcohol use: Yes     Comment: occ    Drug use: Never           REVIEW OF SYSTEMS:   Denies nausea, fever, chills  No calf pain  No other muscle or joint aches  Denies chest pain or shortness of breath.      EXAM:   BP (!) 157/104   Pulse 65     Constitutional:   Patient in no apparent distress. Well kept Of normal body habitus. Alert and oriented to person, place, and time.  Integumentary examination:   There are no varicosities.   Skin appears moist, warm, and supple with positive hair growth.     Right hallux lateral nail border incurvated with no SOI    Vascular examination:   DP pulse is 2/4  PT pulse is 2/4  Capillary refill is immediate  Edema is not present bilateral.  Temperature warm proximally to warm distally bilateral.  Neurological examination:   Vibratory (128-Hz tuning fork) sensation is present to right and is present to left.  Sharp/dull is  present to right and is present to left.  Musculoskeletal examination:  Muscle Strength is 5/5.    POP to right hallux lateral nail border    LABS & IMAGING:     Lab Results   Component Value Date    GLU 94 08/08/2023    BUN 17 08/08/2023    CREATSERUM 1.04 08/08/2023    BUNCREA SEE NOTE: 08/08/2023    CA 9.7 08/08/2023     08/08/2023    K 4.6 08/08/2023     08/08/2023    CO2 28 08/08/2023        No results found for: \"EAG\", \"A1C\"     No results found.     ASSESSMENT AND PLAN:   Diagnoses and all orders for this visit:    Ingrown right big toenail    Great toe pain, right              Plan:       Treatment options reviewed with the patient related to condition/diagnosis.  Discussed advantages and disadvantages as well as risks/potential complications related to nail surgery.  Risk of recurrence explained.      Pt would like to proceed with right hallux lateral nail border matrixectomy    All questions answered, informed consent reviewed and pt agrees to proceed.  PROCEDURE: 96168 Right hallux lateral nail border matrixectomy  Local infiltration was given consisting of 3 cc of a 1 :1 mixture of 0.5% marcaine plain and 1% lidocaine plain  Betadine prep was preformed to the affected toe followed by proper sterile draping.  After confirming full anesthetic effect to the proper site, the nail fold was released from the lateral border of the toe.  An english anvil was used to initiate the linear splitting of the nail border and completed with a #62 blade.  The hemostat was used to completely resect the offending nail border. Curettage was performed to the nail bed and exposed nail matrix. At that time, 3 - 30 second applications of Phenol was applied to the exposed nail matrix using cotton tip applicators. The site was lightly irrigated and a moist sterile dressing was applied.  Pt tolerated procedure well with no complications encountered.  Written instructions have been given and reviewed with pt regarding  after care.      RTC 3 weeks for right hallux lateral nail border matrixectomy check    No follow-ups on file.    Jose G Matt DPM  5/29/24

## 2024-05-29 NOTE — PROGRESS NOTES
Per Verbal orders from Dr. Matt, draw up 1.5ml of 0.5% Marcaine and 1.5ml of 1% lidocaine for injection to right big toe. Cara Lomeli

## 2024-06-19 ENCOUNTER — OFFICE VISIT (OUTPATIENT)
Dept: PODIATRY CLINIC | Facility: CLINIC | Age: 34
End: 2024-06-19

## 2024-06-19 DIAGNOSIS — Z98.890 S/P NAIL SURGERY: Primary | ICD-10-CM

## 2024-06-19 PROCEDURE — 99212 OFFICE O/P EST SF 10 MIN: CPT | Performed by: PODIATRIST

## 2024-06-19 NOTE — PROGRESS NOTES
[facility] Podiatry  Progress Note    Magdaleno Morillo is a 33 year old male.   Chief Complaint   Patient presents with    Follow - Up     Post procedure f/u for rright hallux matrixectomy check. Pain 0-3/10 worse when applying pressure on it.         HPI:     This is a pleasant male that presents to clinic today 3 weeks S/P right hallux lateral nail border matrixectomy check.  He denies any drainage and states it has been improving.      Allergies: Patient has no known allergies.   Current Outpatient Medications   Medication Sig Dispense Refill    nicotine 21 MG/24HR Transdermal Patch 24 Hr Place 1 patch onto the skin daily. 30 each 2    alfuzosin ER 10 MG Oral Tablet 24 Hr Take 1 tablet (10 mg total) by mouth daily with dinner. 90 tablet 1      No past medical history on file.   No past surgical history on file.   No family history on file.   Social History     Socioeconomic History    Marital status:    Tobacco Use    Smoking status: Every Day    Smokeless tobacco: Never   Substance and Sexual Activity    Alcohol use: Yes     Comment: occ    Drug use: Never           REVIEW OF SYSTEMS:   Denies nausea, fever, chills  No calf pain  No other muscle or joint aches  Denies chest pain or shortness of breath.      EXAM:   There were no vitals taken for this visit.    Constitutional:   Patient in no apparent distress. Well kept Of normal body habitus. Alert and oriented to person, place, and time.  Integumentary examination:   There are no varicosities.   Skin appears moist, warm, and supple with positive hair growth.     Right hallux lateral nail fold is healed with no SOI    Vascular examination:   DP pulse is 2/4  PT pulse is 2/4  Capillary refill is immediate  Edema is not present bilateral.  Temperature warm proximally to warm distally bilateral.  Neurological examination:   Vibratory (128-Hz tuning fork) sensation is present to right and is present to left.  Sharp/dull is present to right and is present to  left.  Musculoskeletal examination:  Muscle Strength is 5/5.        LABS & IMAGING:     Lab Results   Component Value Date    GLU 94 08/08/2023    BUN 17 08/08/2023    CREATSERUM 1.04 08/08/2023    BUNCREA SEE NOTE: 08/08/2023    CA 9.7 08/08/2023     08/08/2023    K 4.6 08/08/2023     08/08/2023    CO2 28 08/08/2023        No results found for: \"EAG\", \"A1C\"     No results found.     ASSESSMENT AND PLAN:   Diagnoses and all orders for this visit:    S/P nail surgery                Plan:       Evaluated right hallux lateral nail fold which has healed with no SOI.     RTC PRN    No follow-ups on file.    Jose G Matt DPM  6/19/24

## 2024-06-24 RX ORDER — NICOTINE 21 MG/24HR
1 PATCH, TRANSDERMAL 24 HOURS TRANSDERMAL DAILY
Qty: 30 EACH | Refills: 3 | Status: SHIPPED | OUTPATIENT
Start: 2024-06-24

## 2024-06-24 NOTE — TELEPHONE ENCOUNTER
A refill request was received for:  Requested Prescriptions     Pending Prescriptions Disp Refills    NICOTINE 21 MG/24HR Transdermal Patch 24 Hr [Pharmacy Med Name: Nicotine Transdermal System 24hr 21 Mg/24hr Dis ]  0     Sig: APPLY ONE PATCH EXTERNALLY ONE TIME DAILY     Last refill date:  3/21/24    Last office visit: 3/21/24      No future appointments.

## 2024-08-08 ENCOUNTER — OFFICE VISIT (OUTPATIENT)
Dept: INTERNAL MEDICINE CLINIC | Facility: CLINIC | Age: 34
End: 2024-08-08
Payer: COMMERCIAL

## 2024-08-08 VITALS
OXYGEN SATURATION: 97 % | SYSTOLIC BLOOD PRESSURE: 120 MMHG | HEART RATE: 85 BPM | WEIGHT: 184 LBS | BODY MASS INDEX: 25.76 KG/M2 | DIASTOLIC BLOOD PRESSURE: 70 MMHG | HEIGHT: 71 IN

## 2024-08-08 DIAGNOSIS — S39.012A STRAIN OF LUMBAR REGION, INITIAL ENCOUNTER: Primary | ICD-10-CM

## 2024-08-08 DIAGNOSIS — L65.9 HAIR LOSS: ICD-10-CM

## 2024-08-08 PROCEDURE — 99213 OFFICE O/P EST LOW 20 MIN: CPT | Performed by: INTERNAL MEDICINE

## 2024-08-08 RX ORDER — CYCLOBENZAPRINE HCL 10 MG
10 TABLET ORAL 3 TIMES DAILY
Qty: 30 TABLET | Refills: 1 | Status: SHIPPED | OUTPATIENT
Start: 2024-08-08 | End: 2024-08-28

## 2024-08-08 NOTE — PROGRESS NOTES
Subjective:   Magdaleno Morillo is a 33 year old male who presents for Back Pain     Patient here for evaluation of lower lumbar spasms ongoing  on and off for last 6 months.  Had some episodes that precluded him from going to work.  Works as an  but does not do any heavy lifting or straining.  History/Other:    Chief Complaint Reviewed and Verified  No Further Nursing Notes to   Review  Medications Reviewed         Tobacco - Smokes 1/2 pack per day    Please update the information in the Tobacco history section to reflect the true status, and refresh this smartlink.  Social History     Tobacco Use   Smoking Status Every Day   Smokeless Tobacco Never        Tobacco cessation counseling for 3-10 minutes (add E/M code #20684).      Current Outpatient Medications   Medication Sig Dispense Refill    cyclobenzaprine 10 MG Oral Tab Take 1 tablet (10 mg total) by mouth 3 (three) times daily for 20 days. 30 tablet 1    nicotine 21 MG/24HR Transdermal Patch 24 Hr APPLY ONE PATCH EXTERNALLY ONE TIME DAILY 30 each 3    alfuzosin ER 10 MG Oral Tablet 24 Hr Take 1 tablet (10 mg total) by mouth daily with dinner. 90 tablet 1         Review of Systems:  Review of Systems   Musculoskeletal:  Positive for back pain.         Objective:   /70   Pulse 85   Ht 5' 11\" (1.803 m)   Wt 184 lb (83.5 kg)   SpO2 97%   BMI 25.66 kg/m²  Estimated body mass index is 25.66 kg/m² as calculated from the following:    Height as of this encounter: 5' 11\" (1.803 m).    Weight as of this encounter: 184 lb (83.5 kg).  Physical Exam  Constitutional:       Appearance: Normal appearance.   Musculoskeletal:         General: Tenderness (diffuse paravertebral muscle spasm) present.   Skin:     Findings: Lesion (sub q lesion consistent with lipoma) present.   Neurological:      Mental Status: He is alert.           Assessment & Plan:   1. Strain of lumbar region, initial encounter (Primary) check xray - trial of Flexeril 10 mg q 6prn - may  benefit from PT  -     XR LUMBAR SPINE (MIN 4 VIEWS) (CPT=72110); Future; Expected date: 08/08/2024  Other orders  -     Cyclobenzaprine HCl; Take 1 tablet (10 mg total) by mouth 3 (three) times daily for 20 days.  Dispense: 30 tablet; Refill: 1        No follow-ups on file.    Liliane Freeman MD, 8/8/2024, 2:46 PM

## 2024-08-09 ENCOUNTER — LAB ENCOUNTER (OUTPATIENT)
Dept: LAB | Age: 34
End: 2024-08-09
Attending: INTERNAL MEDICINE
Payer: COMMERCIAL

## 2024-08-09 ENCOUNTER — HOSPITAL ENCOUNTER (OUTPATIENT)
Dept: GENERAL RADIOLOGY | Age: 34
Discharge: HOME | End: 2024-08-09
Attending: INTERNAL MEDICINE
Payer: COMMERCIAL

## 2024-08-09 DIAGNOSIS — S39.012A STRAIN OF LUMBAR REGION, INITIAL ENCOUNTER: ICD-10-CM

## 2024-08-09 LAB
DEPRECATED HBV CORE AB SER IA-ACNC: 91.7 NG/ML
TSI SER-ACNC: 1.42 MIU/ML (ref 0.55–4.78)
VIT D+METAB SERPL-MCNC: 44.8 NG/ML (ref 30–100)

## 2024-08-09 PROCEDURE — 84443 ASSAY THYROID STIM HORMONE: CPT | Performed by: INTERNAL MEDICINE

## 2024-08-09 PROCEDURE — 36415 COLL VENOUS BLD VENIPUNCTURE: CPT | Performed by: INTERNAL MEDICINE

## 2024-08-09 PROCEDURE — 72110 X-RAY EXAM L-2 SPINE 4/>VWS: CPT | Performed by: INTERNAL MEDICINE

## 2024-08-09 PROCEDURE — 82306 VITAMIN D 25 HYDROXY: CPT | Performed by: INTERNAL MEDICINE

## 2024-08-09 PROCEDURE — 82728 ASSAY OF FERRITIN: CPT | Performed by: INTERNAL MEDICINE

## 2025-01-07 ENCOUNTER — OFFICE VISIT (OUTPATIENT)
Dept: INTERNAL MEDICINE CLINIC | Facility: CLINIC | Age: 35
End: 2025-01-07
Payer: COMMERCIAL

## 2025-01-07 VITALS
DIASTOLIC BLOOD PRESSURE: 70 MMHG | SYSTOLIC BLOOD PRESSURE: 116 MMHG | HEART RATE: 83 BPM | BODY MASS INDEX: 26.74 KG/M2 | WEIGHT: 191 LBS | HEIGHT: 71 IN | OXYGEN SATURATION: 95 %

## 2025-01-07 DIAGNOSIS — L65.9 HAIR LOSS: ICD-10-CM

## 2025-01-07 DIAGNOSIS — Z71.6 ENCOUNTER FOR SMOKING CESSATION COUNSELING: ICD-10-CM

## 2025-01-07 DIAGNOSIS — G44.041 INTRACTABLE CHRONIC PAROXYSMAL HEMICRANIA: Primary | ICD-10-CM

## 2025-01-07 DIAGNOSIS — Z11.3 SCREENING FOR STD (SEXUALLY TRANSMITTED DISEASE): ICD-10-CM

## 2025-01-07 PROCEDURE — 3008F BODY MASS INDEX DOCD: CPT | Performed by: INTERNAL MEDICINE

## 2025-01-07 PROCEDURE — 3074F SYST BP LT 130 MM HG: CPT | Performed by: INTERNAL MEDICINE

## 2025-01-07 PROCEDURE — 3078F DIAST BP <80 MM HG: CPT | Performed by: INTERNAL MEDICINE

## 2025-01-07 PROCEDURE — 99214 OFFICE O/P EST MOD 30 MIN: CPT | Performed by: INTERNAL MEDICINE

## 2025-01-07 RX ORDER — NICOTINE 21 MG/24HR
1 PATCH, TRANSDERMAL 24 HOURS TRANSDERMAL EVERY 24 HOURS
Qty: 30 EACH | Refills: 11 | Status: SHIPPED | OUTPATIENT
Start: 2025-01-07

## 2025-01-07 NOTE — PROGRESS NOTES
Subjective:   Magdaleno Morillo is a 34 year old male who presents for  Headache     Patient here for evaluation of chronic intractable headaches in the bitemporal region described as pulsating.  No aura or nausea.  Has family hx of migraines - mother.  Has nearly daily headaches slightly responsive to ibuprofen.  History/Other:    Chief Complaint Reviewed and Verified  No Further Nursing Notes to   Review  Medications Reviewed         Tobacco:   Smoker 15/day    Please update the information in the Tobacco history section to reflect the true status, and refresh this smartlink.  Social History     Tobacco Use   Smoking Status Every Day   Smokeless Tobacco Never        Tobacco cessation counseling for 3-10 minutes (add E/M code #31437).      Current Outpatient Medications   Medication Sig Dispense Refill    nicotine 21 MG/24HR Transdermal Patch 24 Hr Place 1 patch onto the skin daily. 30 each 11    nicotine 21 MG/24HR Transdermal Patch 24 Hr APPLY ONE PATCH EXTERNALLY ONE TIME DAILY (Patient not taking: Reported on 1/7/2025) 30 each 3    alfuzosin ER 10 MG Oral Tablet 24 Hr Take 1 tablet (10 mg total) by mouth daily with dinner. (Patient not taking: Reported on 1/7/2025) 90 tablet 1         Review of Systems:  Review of Systems   Neurological:  Positive for headaches. Negative for dizziness, weakness and numbness.         Objective:   /70   Pulse 83   Ht 5' 11\" (1.803 m)   Wt 191 lb (86.6 kg)   SpO2 95%   BMI 26.64 kg/m²  Estimated body mass index is 26.64 kg/m² as calculated from the following:    Height as of this encounter: 5' 11\" (1.803 m).    Weight as of this encounter: 191 lb (86.6 kg).  Physical Exam  Constitutional:       Appearance: Normal appearance.   HENT:      Head: Normocephalic and atraumatic.      Mouth/Throat:      Pharynx: No posterior oropharyngeal erythema.   Eyes:      General: No scleral icterus.     Pupils: Pupils are equal, round, and reactive to light.   Neck:      Vascular: No  carotid bruit.   Cardiovascular:      Rate and Rhythm: Normal rate and regular rhythm.   Pulmonary:      Effort: Pulmonary effort is normal.      Breath sounds: Normal breath sounds.   Abdominal:      Palpations: Abdomen is soft.      Tenderness: There is no abdominal tenderness.   Musculoskeletal:      Cervical back: Neck supple.   Neurological:      General: No focal deficit present.      Mental Status: He is alert and oriented to person, place, and time.   Psychiatric:         Thought Content: Thought content normal.           Assessment & Plan:   1. Intractable chronic paroxysmal hemicrania (Primary) check ct head  -     CT BRAIN OR HEAD (CPT=70450); Future; Expected date: 01/07/2025  -     CBC With Differential With Platelet; Future; Expected date: 01/07/2025  -     Comp Metabolic Panel (14); Future; Expected date: 01/07/2025  -     Lipid Panel; Future; Expected date: 01/07/2025  2. Hair loss check hormone level and cbc  -     TSH and Free T4; Future; Expected date: 01/07/2025  -     Testosterone Total; Future; Expected date: 01/07/2025  3. Encounter for smoking cessation counseling nicotine patch 21 mg daily  Other orders  -     Nicotine; Place 1 patch onto the skin daily.  Dispense: 30 each; Refill: 11        No follow-ups on file.    Liliane Freeman MD, 1/7/2025, 4:09 PM

## 2025-01-08 ENCOUNTER — LAB ENCOUNTER (OUTPATIENT)
Dept: LAB | Age: 35
End: 2025-01-08
Attending: INTERNAL MEDICINE
Payer: COMMERCIAL

## 2025-01-08 DIAGNOSIS — G44.041 INTRACTABLE CHRONIC PAROXYSMAL HEMICRANIA: ICD-10-CM

## 2025-01-08 DIAGNOSIS — L65.9 HAIR LOSS: ICD-10-CM

## 2025-01-08 DIAGNOSIS — Z11.3 SCREENING FOR STD (SEXUALLY TRANSMITTED DISEASE): ICD-10-CM

## 2025-01-08 LAB
ALBUMIN SERPL-MCNC: 4.6 G/DL (ref 3.2–4.8)
ALBUMIN/GLOB SERPL: 2.2 {RATIO} (ref 1–2)
ALP LIVER SERPL-CCNC: 59 U/L
ALT SERPL-CCNC: 19 U/L
ANION GAP SERPL CALC-SCNC: 7 MMOL/L (ref 0–18)
AST SERPL-CCNC: 22 U/L (ref ?–34)
BASOPHILS # BLD AUTO: 0.08 X10(3) UL (ref 0–0.2)
BASOPHILS NFR BLD AUTO: 1.3 %
BILIRUB SERPL-MCNC: 0.6 MG/DL (ref 0.3–1.2)
BUN BLD-MCNC: 12 MG/DL (ref 9–23)
BUN/CREAT SERPL: 11.8 (ref 10–20)
CALCIUM BLD-MCNC: 9.5 MG/DL (ref 8.7–10.4)
CHLORIDE SERPL-SCNC: 107 MMOL/L (ref 98–112)
CHOLEST SERPL-MCNC: 197 MG/DL (ref ?–200)
CO2 SERPL-SCNC: 29 MMOL/L (ref 21–32)
CREAT BLD-MCNC: 1.02 MG/DL
DEPRECATED RDW RBC AUTO: 42.3 FL (ref 35.1–46.3)
EGFRCR SERPLBLD CKD-EPI 2021: 98 ML/MIN/1.73M2 (ref 60–?)
EOSINOPHIL # BLD AUTO: 0.27 X10(3) UL (ref 0–0.7)
EOSINOPHIL NFR BLD AUTO: 4.4 %
ERYTHROCYTE [DISTWIDTH] IN BLOOD BY AUTOMATED COUNT: 12.2 % (ref 11–15)
FASTING PATIENT LIPID ANSWER: YES
FASTING STATUS PATIENT QL REPORTED: YES
GLOBULIN PLAS-MCNC: 2.1 G/DL (ref 2–3.5)
GLUCOSE BLD-MCNC: 96 MG/DL (ref 70–99)
HCT VFR BLD AUTO: 45.3 %
HDLC SERPL-MCNC: 34 MG/DL (ref 40–59)
HGB BLD-MCNC: 16.1 G/DL
IMM GRANULOCYTES # BLD AUTO: 0.03 X10(3) UL (ref 0–1)
IMM GRANULOCYTES NFR BLD: 0.5 %
LDLC SERPL CALC-MCNC: 133 MG/DL (ref ?–100)
LYMPHOCYTES # BLD AUTO: 2.19 X10(3) UL (ref 1–4)
LYMPHOCYTES NFR BLD AUTO: 35.6 %
MCH RBC QN AUTO: 33.2 PG (ref 26–34)
MCHC RBC AUTO-ENTMCNC: 35.5 G/DL (ref 31–37)
MCV RBC AUTO: 93.4 FL
MONOCYTES # BLD AUTO: 0.53 X10(3) UL (ref 0.1–1)
MONOCYTES NFR BLD AUTO: 8.6 %
NEUTROPHILS # BLD AUTO: 3.06 X10 (3) UL (ref 1.5–7.7)
NEUTROPHILS # BLD AUTO: 3.06 X10(3) UL (ref 1.5–7.7)
NEUTROPHILS NFR BLD AUTO: 49.6 %
NONHDLC SERPL-MCNC: 163 MG/DL (ref ?–130)
OSMOLALITY SERPL CALC.SUM OF ELEC: 296 MOSM/KG (ref 275–295)
PLATELET # BLD AUTO: 180 10(3)UL (ref 150–450)
POTASSIUM SERPL-SCNC: 3.8 MMOL/L (ref 3.5–5.1)
PROT SERPL-MCNC: 6.7 G/DL (ref 5.7–8.2)
RBC # BLD AUTO: 4.85 X10(6)UL
SODIUM SERPL-SCNC: 143 MMOL/L (ref 136–145)
T4 FREE SERPL-MCNC: 1.3 NG/DL (ref 0.8–1.7)
TESTOST SERPL-MCNC: 545.56 NG/DL
TRIGL SERPL-MCNC: 169 MG/DL (ref 30–149)
TSI SER-ACNC: 1.68 UIU/ML (ref 0.55–4.78)
VLDLC SERPL CALC-MCNC: 31 MG/DL (ref 0–30)
WBC # BLD AUTO: 6.2 X10(3) UL (ref 4–11)

## 2025-01-08 PROCEDURE — 85025 COMPLETE CBC W/AUTO DIFF WBC: CPT

## 2025-01-08 PROCEDURE — 87389 HIV-1 AG W/HIV-1&-2 AB AG IA: CPT

## 2025-01-08 PROCEDURE — 36415 COLL VENOUS BLD VENIPUNCTURE: CPT

## 2025-01-08 PROCEDURE — 84443 ASSAY THYROID STIM HORMONE: CPT

## 2025-01-08 PROCEDURE — 84403 ASSAY OF TOTAL TESTOSTERONE: CPT

## 2025-01-08 PROCEDURE — 84439 ASSAY OF FREE THYROXINE: CPT

## 2025-01-08 PROCEDURE — 80061 LIPID PANEL: CPT

## 2025-01-08 PROCEDURE — 80053 COMPREHEN METABOLIC PANEL: CPT

## 2025-01-14 ENCOUNTER — HOSPITAL ENCOUNTER (OUTPATIENT)
Dept: CT IMAGING | Facility: HOSPITAL | Age: 35
Discharge: HOME | End: 2025-01-14
Attending: INTERNAL MEDICINE
Payer: COMMERCIAL

## 2025-01-14 ENCOUNTER — HOSPITAL ENCOUNTER (OUTPATIENT)
Dept: CT IMAGING | Facility: HOSPITAL | Age: 35
End: 2025-01-14
Attending: INTERNAL MEDICINE
Payer: COMMERCIAL

## 2025-01-14 DIAGNOSIS — G44.041 INTRACTABLE CHRONIC PAROXYSMAL HEMICRANIA: ICD-10-CM

## 2025-01-14 PROCEDURE — 70450 CT HEAD/BRAIN W/O DYE: CPT | Performed by: INTERNAL MEDICINE

## 2025-01-16 RX ORDER — PREDNISONE 10 MG/1
10 TABLET ORAL 2 TIMES DAILY
Qty: 28 TABLET | Refills: 0 | Status: SHIPPED | OUTPATIENT
Start: 2025-01-16

## 2025-01-16 RX ORDER — LEVOFLOXACIN 500 MG/1
500 TABLET, FILM COATED ORAL DAILY
Qty: 14 TABLET | Refills: 0 | Status: SHIPPED | OUTPATIENT
Start: 2025-01-16

## 2025-01-29 ENCOUNTER — PATIENT MESSAGE (OUTPATIENT)
Dept: INTERNAL MEDICINE CLINIC | Facility: CLINIC | Age: 35
End: 2025-01-29

## 2025-02-06 ENCOUNTER — TELEPHONE (OUTPATIENT)
Dept: INTERNAL MEDICINE CLINIC | Facility: CLINIC | Age: 35
End: 2025-02-06

## 2025-02-06 ENCOUNTER — NURSE TRIAGE (OUTPATIENT)
Dept: INTERNAL MEDICINE CLINIC | Facility: CLINIC | Age: 35
End: 2025-02-06

## 2025-02-06 DIAGNOSIS — G44.89 OTHER HEADACHE SYNDROME: ICD-10-CM

## 2025-02-06 DIAGNOSIS — J35.01 TONSILLITIS, CHRONIC: Primary | ICD-10-CM

## 2025-02-06 NOTE — TELEPHONE ENCOUNTER
Patient states that his headache returned 2 days after he finished his antibiotics and prednisone. He wants to know what to do while he waits to be seen.    Reason for Disposition   Unexplained headache that is present > 24 hours    Answer Assessment - Initial Assessment Questions  1. LOCATION: \"Where does it hurt?\"       Headache   2. ONSET: \"When did the headache start?\" (Minutes, hours or days)       6 months   3. PATTERN: \"Does the pain come and go, or has it been constant since it started?\"      Constant since he finished anabiotic   4. SEVERITY: \"How bad is the pain?\" and \"What does it keep you from doing?\"  (e.g., Scale 1-10; mild, moderate, or severe)    - MILD (1-3): doesn't interfere with normal activities     - MODERATE (4-7): interferes with normal activities or awakens from sleep     - SEVERE (8-10): excruciating pain, unable to do any normal activities         7/10   5. RECURRENT SYMPTOM: \"Have you ever had headaches before?\" If Yes, ask: \"When was the last time?\" and \"What happened that time?\"       6 months   6. CAUSE: \"What do you think is causing the headache?\"      Unsure   7. MIGRAINE: \"Have you been diagnosed with migraine headaches?\" If Yes, ask: \"Is this headache similar?\"       No   8. HEAD INJURY: \"Has there been any recent injury to the head?\"       No   9. OTHER SYMPTOMS: \"Do you have any other symptoms?\" (fever, stiff neck, eye pain, sore throat, cold symptoms)      Stuffed nose/ tired  10. PREGNANCY: \"Is there any chance you are pregnant?\" \"When was your last menstrual period?\"        NA    Protocols used: Headache-A-OH

## 2025-02-10 ENCOUNTER — OFFICE VISIT (OUTPATIENT)
Dept: OTOLARYNGOLOGY | Facility: CLINIC | Age: 35
End: 2025-02-10
Payer: COMMERCIAL

## 2025-02-10 DIAGNOSIS — R51.9 CHRONIC NONINTRACTABLE HEADACHE, UNSPECIFIED HEADACHE TYPE: Primary | ICD-10-CM

## 2025-02-10 DIAGNOSIS — J34.3 HYPERTROPHY OF BOTH INFERIOR NASAL TURBINATES: ICD-10-CM

## 2025-02-10 DIAGNOSIS — M27.40 MAXILLARY CYST: ICD-10-CM

## 2025-02-10 DIAGNOSIS — G89.29 CHRONIC NONINTRACTABLE HEADACHE, UNSPECIFIED HEADACHE TYPE: Primary | ICD-10-CM

## 2025-02-10 DIAGNOSIS — J34.2 NASAL SEPTAL DEVIATION: ICD-10-CM

## 2025-02-10 NOTE — PROGRESS NOTES
Magdaleno Morillo is a 34 year old male.   Chief Complaint   Patient presents with    Headache     Headaches      HPI:   Is a 34-year-old who presents with chronic headaches.  He says that they have been going on for longer than a year.  They are in the frontal and temporal region.  He had a CT of his brain that demonstrated a small right maxillary sinus retention cyst    Current Outpatient Medications   Medication Sig Dispense Refill    predniSONE 10 MG Oral Tab Take 1 tablet (10 mg total) by mouth 2 (two) times daily. 28 tablet 0    levoFLOXacin 500 MG Oral Tab Take 1 tablet (500 mg total) by mouth daily. 14 tablet 0    nicotine 21 MG/24HR Transdermal Patch 24 Hr Place 1 patch onto the skin daily. 30 each 11    nicotine 21 MG/24HR Transdermal Patch 24 Hr APPLY ONE PATCH EXTERNALLY ONE TIME DAILY (Patient not taking: Reported on 2/10/2025) 30 each 3    alfuzosin ER 10 MG Oral Tablet 24 Hr Take 1 tablet (10 mg total) by mouth daily with dinner. (Patient not taking: Reported on 1/7/2025) 90 tablet 1      History reviewed. No pertinent past medical history.   Social History:  Social History     Socioeconomic History    Marital status:    Tobacco Use    Smoking status: Every Day    Smokeless tobacco: Never   Substance and Sexual Activity    Alcohol use: Yes     Comment: occ    Drug use: Never      History reviewed. No pertinent surgical history.      EXAM:   There were no vitals taken for this visit.    System Details   Skin Inspection - Normal.   Constitutional Overall appearance - Normal.   Head/Face Symmetric, TMJ tenderness not present    Eyes EOMI, PERRL   Right ear:  Canal clear, TM intact, no MICHAEL   Left ear:  Canal clear, TM intact, no MICHAEL   Nose: Septum midline, inferior turbinates not enlarged, nasal valves without collapse    Oral cavity/Oropharynx: No lesions or masses on inspection or palpation, tonsils symmetric    Neck: Soft without LAD, thyroid not enlarged  Voice clear/ no stridor   Other:      SCOPES  AND PROCEDURES:     Nasal Endoscopy Procedure Note     Due to inability for adequate examination of the nose and nasopharynx and need for magnification to perform the examination, endoscopy was performed.  Risks and benefits were discussed with patient/family and they have given verbal consent to proceed.    Pre-operative Diagnosis:   1. Chronic nonintractable headache, unspecified headache type    2. Nasal septal deviation    3. Hypertrophy of both inferior nasal turbinates    4. Maxillary cyst        Post-operative Diagnosis: Same    Procedure: Diagnostic nasal endoscopy    Anesthesia: Topical anesthetic [place]     Surgeon Franco Harmon MD    EBL: 0cc    Procedure Detail & Findings:     After placement of topical anesthetic intranasally the endoscope was inserted into each nares and driven through the nasal cavity into the nasopharynx. The following findings were noted:    Septum: There is a spur of his anterior septum seemingly in contact with the inferior turbinate  Inferior turbinates: Hypertrophy on each side  Middle meatus: Patent  Middle turbinates: Normal  Purulence: None noted  Polyps: None noted  Nasopharynx and eustachian tube: No masses  Other: The middle and superior meatus, the turbinates, and the spheno-ethmoid recess were inspected and seen to be without significant abnormal findings.     Condition: Stable    Complications: Patient tolerated the procedure well with no immediate complication.    Franco Harmon MD    AUDIOGRAM AND IMAGING:         IMPRESSION:   1. Chronic nonintractable headache, unspecified headache type  - [facility]); Future    2. Nasal septal deviation    3. Hypertrophy of both inferior nasal turbinates    4. Maxillary cyst       Recommendations:  -I reviewed the report of the CT of his head that demonstrated a small right maxillary likely retention cyst with remaining sinuses well aerated.  Mild thickening in the right ethmoid air cells  -Think it would be  unlikely that this cyst is causing his chronic bilateral severe headaches.  The majority of the cysts are incidentally found and asymptomatic and his on the smaller side and his headaches are bilateral and in the temporal and frontal region  -Will obtain a CT scan of his sinuses following his current course of antibiotics and steroids to better characterize his sinuses following this treatment  -If the repeat CT scan is largely clear may consider he be seen by a neurologist for other causes of headaches.  He reports his headaches are largely provoked by caffeine and energy drinks  -If the sinonasal process continues to be of concern could consider septoplasty as he does have a septal deviation/spur with opening up the sinuses with a procedure.  Will follow-up on the CT sinus findings    The patient indicates understanding of these issues and agrees to the plan.      Franco Harmon MD  2/10/2025  4:51 PM

## 2025-02-14 ENCOUNTER — OFFICE VISIT (OUTPATIENT)
Dept: INTERNAL MEDICINE CLINIC | Facility: CLINIC | Age: 35
End: 2025-02-14
Payer: COMMERCIAL

## 2025-02-14 VITALS
DIASTOLIC BLOOD PRESSURE: 70 MMHG | HEIGHT: 71 IN | OXYGEN SATURATION: 98 % | BODY MASS INDEX: 27.44 KG/M2 | SYSTOLIC BLOOD PRESSURE: 120 MMHG | HEART RATE: 92 BPM | WEIGHT: 196 LBS

## 2025-02-14 DIAGNOSIS — R51.9 CHRONIC NONINTRACTABLE HEADACHE, UNSPECIFIED HEADACHE TYPE: Primary | ICD-10-CM

## 2025-02-14 DIAGNOSIS — G89.29 CHRONIC NONINTRACTABLE HEADACHE, UNSPECIFIED HEADACHE TYPE: Primary | ICD-10-CM

## 2025-02-14 PROCEDURE — 3078F DIAST BP <80 MM HG: CPT | Performed by: INTERNAL MEDICINE

## 2025-02-14 PROCEDURE — 3074F SYST BP LT 130 MM HG: CPT | Performed by: INTERNAL MEDICINE

## 2025-02-14 PROCEDURE — 3008F BODY MASS INDEX DOCD: CPT | Performed by: INTERNAL MEDICINE

## 2025-02-14 PROCEDURE — 99214 OFFICE O/P EST MOD 30 MIN: CPT | Performed by: INTERNAL MEDICINE

## 2025-02-14 NOTE — PROGRESS NOTES
Subjective:   Magdaleno Morillo is a 34 year old male who presents for Follow - Up and Ah Headache     Patient here for fu on ongoing chronic frontal headaches pulsating in nature,  clearly responding to two separate treatment courses of antibiotic and steroid.  No aura or nausea or photophobia.  Has seen ent  who put doubt on sinus background ?  Ordered a ct of sinuses.  History/Other:    Chief Complaint Reviewed and Verified  No Further Nursing Notes to   Review  Medications Reviewed         Tobacco:1/2 pack    Please update the information in the Tobacco history section to reflect the true status, and refresh this smartlink.  Social History     Tobacco Use   Smoking Status Every Day   Smokeless Tobacco Never        Tobacco cessation counseling for 3-10 minutes (add E/M code #56188).      Current Outpatient Medications   Medication Sig Dispense Refill    predniSONE 10 MG Oral Tab Take 1 tablet (10 mg total) by mouth 2 (two) times daily. 28 tablet 0    levoFLOXacin 500 MG Oral Tab Take 1 tablet (500 mg total) by mouth daily. 14 tablet 0    nicotine 21 MG/24HR Transdermal Patch 24 Hr Place 1 patch onto the skin daily. 30 each 11         Review of Systems:  Review of Systems   Neurological:  Positive for headaches. Negative for light-headedness.   Psychiatric/Behavioral: Negative.           Objective:   /70   Pulse 92   Ht 5' 11\" (1.803 m)   Wt 196 lb (88.9 kg)   SpO2 98%   BMI 27.34 kg/m²  Estimated body mass index is 27.34 kg/m² as calculated from the following:    Height as of this encounter: 5' 11\" (1.803 m).    Weight as of this encounter: 196 lb (88.9 kg).  Physical Exam  Constitutional:       Appearance: Normal appearance.   HENT:      Head: Normocephalic and atraumatic.      Right Ear: Ear canal normal.      Left Ear: Ear canal normal.      Mouth/Throat:      Pharynx: Oropharynx is clear.   Eyes:      General: No scleral icterus.  Neck:      Vascular: No carotid bruit.   Cardiovascular:      Rate and  Rhythm: Normal rate and regular rhythm.   Pulmonary:      Effort: Pulmonary effort is normal.      Breath sounds: Normal breath sounds.   Musculoskeletal:         General: No tenderness.      Cervical back: Neck supple.   Neurological:      Mental Status: He is alert.           Assessment & Plan:   1. Chronic nonintractable headache, unspecified headache type (Primary)  still suspect sinus etiology with brisk response to AB and steroid - await ct sinuses will also refer to neurology to explore other etiologies.  -     Neuro Referral - [facility] ([place])        No follow-ups on file.    Liliane Freeman MD, 2/14/2025, 12:43 PM

## 2025-02-25 ENCOUNTER — HOSPITAL ENCOUNTER (OUTPATIENT)
Dept: CT IMAGING | Facility: HOSPITAL | Age: 35
Discharge: HOME | End: 2025-02-25
Attending: STUDENT IN AN ORGANIZED HEALTH CARE EDUCATION/TRAINING PROGRAM
Payer: COMMERCIAL

## 2025-02-25 DIAGNOSIS — G89.29 CHRONIC NONINTRACTABLE HEADACHE, UNSPECIFIED HEADACHE TYPE: ICD-10-CM

## 2025-02-25 DIAGNOSIS — R51.9 CHRONIC NONINTRACTABLE HEADACHE, UNSPECIFIED HEADACHE TYPE: ICD-10-CM

## 2025-02-25 PROCEDURE — 70486 CT MAXILLOFACIAL W/O DYE: CPT | Performed by: STUDENT IN AN ORGANIZED HEALTH CARE EDUCATION/TRAINING PROGRAM

## 2025-02-27 ENCOUNTER — TELEPHONE (OUTPATIENT)
Dept: OTOLARYNGOLOGY | Facility: CLINIC | Age: 35
End: 2025-02-27

## (undated) NOTE — LETTER
AUTHORIZATION FOR SURGICAL OPERATION OR OTHER PROCEDURE    1. I hereby authorize Jose G Mendez  and [facility] staff assigned to my case to perform the following operation and/or procedure at the [facility] site:    Correction of ingrown toenail on Right big toe   _______________________________________________________________________________________________      _______________________________________________________________________________________________    2.  My physician has explained the nature and purpose of the operation or other procedure, possible alternative methods of treatment, the risks involved, and the possibility of complication to me.  I acknowledge that no guarantee has been made as to the result that may be obtained.  3.  I recognize that, during the course of this operation, or other procedure, unforseen conditions may necessitate additional or different procedure than those listed above.  I, therefore, further authorize and request that the above named physician, his/her physician assistants or designees perform such procedures as are, in his/her professional opinion, necessary and desirable.  4.  Any tissue or organs removed in the operation or other procedure may be disposed of by and at the discretion of the [facility] and [facility].  5.  I understand that in the event of a medical emergency, I will be transported by local paramedics to [facility] or other hospital emergency department.  6.  I certify that I have read and fully understand the above consent to operation and/or other procedure.    7.  I acknowledge that my physician has explained sedation/analgesia administration to me including the risks and benefits.  I consent to the administration of sedation/analgesia as may be necessary or desirable in the judgement of my physician.    Witness signature: ___________________________________________________ Date:   ______/______/_____                    Time:  ________ A.M.  P.M.       Patient Name:  ______________________________________________________  (please print)      Patient signature:  ___________________________________________________             Relationship to Patient:           []  Parent    Responsible person                          []  Spouse  In case of minor or                    [] Other  _____________   Incompetent name:  __________________________________________________                               (please print)      _____________      Responsible person  In case of minor or  Incompetent signature:  _______________________________________________    Statement of Physician  My signature below affirms that prior to the time of the procedure, I have explained to the patient and/or his/her guardian, the risks and benefits involved in the proposed treatment and any reasonable alternative to the proposed treatment.  I have also explained the risks and benefits involved in the refusal of the proposed treatment and have answered the patient's questions.                        Date:  ______/______/_______  Provider                      Signature:  __________________________________________________________       Time:  ___________ A.M    P.M.

## (undated) NOTE — LETTER
AUTHORIZATION FOR SURGICAL OPERATION OR OTHER PROCEDURE    1.  I hereby authorize Dr. William Louie  and [facility] staff assigned to my case to perform the following operation and/or procedure at the [facility]:    Permanent matrixectomy on ______/______/_____                    Time:  ________ A. M.  P.M.        Patient Name:  ______________________________________________________  (please print)      Patient signature:  ___________________________________________________             Relations